# Patient Record
Sex: MALE | Race: AMERICAN INDIAN OR ALASKA NATIVE
[De-identification: names, ages, dates, MRNs, and addresses within clinical notes are randomized per-mention and may not be internally consistent; named-entity substitution may affect disease eponyms.]

---

## 2020-10-06 ENCOUNTER — HOSPITAL ENCOUNTER (EMERGENCY)
Dept: HOSPITAL 5 - ED | Age: 25
Discharge: HOME | End: 2020-10-06
Payer: COMMERCIAL

## 2020-10-06 VITALS — SYSTOLIC BLOOD PRESSURE: 167 MMHG | DIASTOLIC BLOOD PRESSURE: 96 MMHG

## 2020-10-06 DIAGNOSIS — I10: ICD-10-CM
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
## 2020-10-06 DIAGNOSIS — J45.909: ICD-10-CM

## 2020-10-06 DIAGNOSIS — S93.401A: Primary | ICD-10-CM
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
## 2020-10-06 DIAGNOSIS — Z79.899: ICD-10-CM
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
## 2020-10-06 NOTE — XRAY REPORT
RIGHT ANKLE 3 VIEWS



INDICATION / CLINICAL INFORMATION:

Fall with right lateral ankle pain.



COMPARISON:

None available.

 

FINDINGS:



BONES / JOINT(S): The joint spaces are well-maintained. There is no evidence of fracture or subluxati
on.

SOFT TISSUES: There is mild generalized soft tissue swelling or soft tissue prominence.



ADDITIONAL FINDINGS: None.







Signer Name: Lion Kincaid MD 

Signed: 10/6/2020 3:03 PM

Workstation Name: [id number]

## 2020-10-06 NOTE — EMERGENCY DEPARTMENT REPORT
ED Lower Extremity HPI





- General


Chief Complaint: Fall


Stated Complaint: SLIP AND FALL


Time Seen by Provider: 10/06/20 14:58


Source: EMS


Mode of arrival: Ambulatory


Limitations: No Limitations





- History of Present Illness


Initial Comments: 





Patient is a 25-year-old male presents emergency room with points of a slip and 

fall that occurred earlier today.  He states that he was at the store and 

accidentally slipped and fell.  He is complaining of right ankle pain and 

headache.  He has not been ambulatory since the incident secondary to pain.  He 

is currently in an orthopedic boot on the left foot secondary to another injury 

from before.  He is currently under the care of an orthopedic for the left leg. 

He denies any loss of consciousness, vision changes, vomiting, numbness, 

weakness, bowel or bladder continence, any other injury. PMHx asthma, HTN. no 

allergies to meds. 





- Related Data


                                Home Medications











 Medication  Instructions  Recorded  Confirmed  Last Taken


 


Albuterol Sulfate [Ventolin HFA] 2 puff IH PRN PRN MDD 8 09/10/16 09/10/16 

Unknown








                                  Previous Rx's











 Medication  Instructions  Recorded  Last Taken  Type


 


Naproxen [EC-Naproxen] 500 mg PO BID PRN #14 tablet. 10/06/20 Unknown Rx











                                    Allergies











Allergy/AdvReac Type Severity Reaction Status Date / Time


 


No Known Allergies Allergy   Unverified 01/26/14 11:46














ED Review of Systems


ROS: 


Stated complaint: SLIP AND FALL


Other details as noted in HPI





Comment: All other systems reviewed and negative





ED Past Medical Hx





- Past Medical History


Previous Medical History?: Yes


Hx Hypertension: Yes


Hx Asthma: Yes





- Surgical History


Past Surgical History?: No





- Social History


Smoking Status: Never Smoker


Substance Use Type: None





- Medications


Home Medications: 


                                Home Medications











 Medication  Instructions  Recorded  Confirmed  Last Taken  Type


 


Albuterol Sulfate [Ventolin HFA] 2 puff IH PRN PRN MDD 8 09/10/16 09/10/16 

Unknown History


 


Naproxen [EC-Naproxen] 500 mg PO BID PRN #14 tablet. 10/06/20  Unknown Rx














ED Physical Exam





- General


Limitations: No Limitations


General appearance: alert, in no apparent distress





- Head


Head exam: Present: atraumatic, normocephalic





- Eye


Eye exam: Present: normal appearance, PERRL, EOMI.  Absent: periorbital 

swelling, periorbital tenderness


Pupils: Present: normal accommodation





- ENT


ENT exam: Present: mucous membranes moist





- Respiratory


Respiratory exam: Absent: respiratory distress, accessory muscle use





- Extremities Exam


Extremities exam: Present: other (ttp to the right medial ankle, there is mild 

amount of edema, no deformity, FROM of the RLE, neurovasculalry intact)





- Neurological Exam


Neurological exam: Present: alert, oriented X3





- Psychiatric


Psychiatric exam: Present: normal affect, normal mood





- Skin


Skin exam: Present: warm, dry, intact





ED Course


                                   Vital Signs











  10/06/20





  12:34


 


Temperature 98.6 F


 


Pulse Rate 86


 


Respiratory 18





Rate 


 


Blood Pressure 167/96





[Right] 


 


O2 Sat by Pulse 100





Oximetry 














ED Lower Extremity MDM





- Radiology Data


Radiology results: report reviewed





RIGHT ANKLE 3 VIEWS 





INDICATION / CLINICAL INFORMATION: 


Fall with right lateral ankle pain. 





COMPARISON: 


None available. 





FINDINGS: 





BONES / JOINT(S): The joint spaces are well-maintained. There is no evidence of 

fracture or 


 subluxation. 


SOFT TISSUES: There is mild generalized soft tissue swelling or soft tissue 

prominence. 





ADDITIONAL FINDINGS: None. 











Signer Name: Lion Kincaid MD 


Signed: 10/6/2020 3:03 PM 


Workstation Name: [id number] 








 Transcribed By: RT 


 Dictated By: Lion Kincaid MD 


 Electronically Authenticated By: Lion Kincaid MD 


 Signed Date/Time: 10/06/20 1503 











 DD/DT: 10/06/20 1502 


 TD/TT: 





- Medical Decision Making





Patient is a 25-year-old male presents emergency room with points of a slip and 

fall that occurred earlier today.  He states that he was at the store and 

accidentally slipped and fell.  He is complaining of right ankle pain and 

headache.  He has not been ambulatory since the incident secondary to pain.  He 

is currently in an orthopedic boot on the left foot secondary to another injury 

from before.  He is currently under the care of an orthopedic for the left leg. 

 He denies any loss of consciousness, vision changes, vomiting, numbness, 

weakness, bowel or bladder continence, any other injury. PMHx asthma, HTN. no 

allergies to meds. VSS. on exam: ttp to the right medial ankle, there is mild 

amount of edema, no deformity, FROM of the RLE, neurovasculalry intact. XR right

 ankle: BONES / JOINT(S): The joint spaces are well-maintained. There is no 

evidence of fracture or subluxation. SOFT TISSUES: There is mild generalized 

soft tissue swelling or soft tissue prominence. ADDITIONAL FINDINGS: None.  

[place] CT head rule is 0, CT head imaging is not recommended.  Discussed all r

esults with patient.  Patient placed in ankle stirrup splint and given crutches.

  I was advised by nurse that the patient refuses crutches.  Advised patient 

that he does not need to be bearing weight to prevent further injury.  Patient 

given prescription for naproxen.  Advised patient please take medication as 

prescribed. may use ice for 15 minutes at a time, rest, elevation of the leg. do

 not bear weight on the leg until you follow up with an orthopedic. follow up 

with an orthopedic. return to the emergency room for any new or worsening 

symptoms. 





- Differential Diagnosis


strain, sprain, fx, dislocation, contusion 


Critical care attestation.: 


If time is entered above; I have spent that time in minutes in the direct care 

of this critically ill patient, excluding procedure time.








ED Disposition


Clinical Impression: 


Right ankle sprain


Qualifiers:


 Encounter type: initial encounter Involved ligament of ankle: unspecified 

ligament Qualified Code(s): S93.401A - Sprain of unspecified ligament of right 

ankle, initial encounter





Disposition: DC-01 TO HOME OR SELFCARE


Is pt being admited?: No


Does the pt Need Aspirin: No


Condition: Stable


Instructions:  Ankle Sprain (ED), Crutch Instructions (ED), Ankle Stirrup Splint

 (ED)


Additional Instructions: 


please take medication as prescribed. may use ice for 15 minutes at a time, 

rest, elevation of the leg. do not bear weight on the leg until you follow up 

with an orthopedic. follow up with an orthopedic. return to the emergency room 

for any new or worsening symptoms. 


Prescriptions: 


Naproxen [EC-Naproxen] 500 mg PO BID PRN #14 tablet.dr


 PRN Reason: pain


Referrals: 


PRIMARY CARE,MD [Primary Care Provider] - 2-3 Days


LION BARRAGAN MD [Staff Physician] - 2-3 Days


[facility] [Provider Group] - 2-3 Days


Time of Disposition: 15:53


Print Language: ENGLISH